# Patient Record
Sex: FEMALE | Race: ASIAN
[De-identification: names, ages, dates, MRNs, and addresses within clinical notes are randomized per-mention and may not be internally consistent; named-entity substitution may affect disease eponyms.]

---

## 2018-04-03 ENCOUNTER — HOSPITAL ENCOUNTER (EMERGENCY)
Dept: HOSPITAL 17 - NEPD | Age: 43
LOS: 1 days | Discharge: HOME | End: 2018-04-04
Payer: COMMERCIAL

## 2018-04-03 VITALS — RESPIRATION RATE: 16 BRPM | OXYGEN SATURATION: 98 %

## 2018-04-03 VITALS — BODY MASS INDEX: 34.08 KG/M2 | WEIGHT: 162.35 LBS | HEIGHT: 58 IN

## 2018-04-03 VITALS
RESPIRATION RATE: 18 BRPM | HEART RATE: 95 BPM | SYSTOLIC BLOOD PRESSURE: 146 MMHG | TEMPERATURE: 98.5 F | OXYGEN SATURATION: 98 % | DIASTOLIC BLOOD PRESSURE: 71 MMHG

## 2018-04-03 DIAGNOSIS — K76.0: ICD-10-CM

## 2018-04-03 DIAGNOSIS — R10.11: Primary | ICD-10-CM

## 2018-04-03 LAB
ALBUMIN SERPL-MCNC: 3.7 GM/DL (ref 3.4–5)
ALP SERPL-CCNC: 69 U/L (ref 45–117)
ALT SERPL-CCNC: 56 U/L (ref 10–53)
AST SERPL-CCNC: 23 U/L (ref 15–37)
BASOPHILS # BLD AUTO: 0 TH/MM3 (ref 0–0.2)
BASOPHILS NFR BLD: 0.5 % (ref 0–2)
BILIRUB SERPL-MCNC: 0.3 MG/DL (ref 0.2–1)
BUN SERPL-MCNC: 8 MG/DL (ref 7–18)
CALCIUM SERPL-MCNC: 8.6 MG/DL (ref 8.5–10.1)
CHLORIDE SERPL-SCNC: 103 MEQ/L (ref 98–107)
CREAT SERPL-MCNC: 0.73 MG/DL (ref 0.5–1)
EOSINOPHIL # BLD: 0.2 TH/MM3 (ref 0–0.4)
EOSINOPHIL NFR BLD: 2.5 % (ref 0–4)
ERYTHROCYTE [DISTWIDTH] IN BLOOD BY AUTOMATED COUNT: 13.4 % (ref 11.6–17.2)
GFR SERPLBLD BASED ON 1.73 SQ M-ARVRAT: 87 ML/MIN (ref 89–?)
GLUCOSE SERPL-MCNC: 195 MG/DL (ref 74–106)
HCO3 BLD-SCNC: 25.8 MEQ/L (ref 21–32)
HCT VFR BLD CALC: 40 % (ref 35–46)
HGB BLD-MCNC: 13.7 GM/DL (ref 11.6–15.3)
LYMPHOCYTES # BLD AUTO: 2.6 TH/MM3 (ref 1–4.8)
LYMPHOCYTES NFR BLD AUTO: 29.1 % (ref 9–44)
MCH RBC QN AUTO: 27.3 PG (ref 27–34)
MCHC RBC AUTO-ENTMCNC: 34.3 % (ref 32–36)
MCV RBC AUTO: 79.7 FL (ref 80–100)
MONOCYTE #: 0.8 TH/MM3 (ref 0–0.9)
MONOCYTES NFR BLD: 8.7 % (ref 0–8)
NEUTROPHILS # BLD AUTO: 5.4 TH/MM3 (ref 1.8–7.7)
NEUTROPHILS NFR BLD AUTO: 59.2 % (ref 16–70)
PLATELET # BLD: 323 TH/MM3 (ref 150–450)
PMV BLD AUTO: 6.9 FL (ref 7–11)
PROT SERPL-MCNC: 8.2 GM/DL (ref 6.4–8.2)
RBC # BLD AUTO: 5.02 MIL/MM3 (ref 4–5.3)
SODIUM SERPL-SCNC: 138 MEQ/L (ref 136–145)
WBC # BLD AUTO: 9 TH/MM3 (ref 4–11)

## 2018-04-03 PROCEDURE — 96361 HYDRATE IV INFUSION ADD-ON: CPT

## 2018-04-03 PROCEDURE — 96375 TX/PRO/DX INJ NEW DRUG ADDON: CPT

## 2018-04-03 PROCEDURE — 99284 EMERGENCY DEPT VISIT MOD MDM: CPT

## 2018-04-03 PROCEDURE — 84703 CHORIONIC GONADOTROPIN ASSAY: CPT

## 2018-04-03 PROCEDURE — 80053 COMPREHEN METABOLIC PANEL: CPT

## 2018-04-03 PROCEDURE — 85025 COMPLETE CBC W/AUTO DIFF WBC: CPT

## 2018-04-03 PROCEDURE — 96374 THER/PROPH/DIAG INJ IV PUSH: CPT

## 2018-04-03 PROCEDURE — 74176 CT ABD & PELVIS W/O CONTRAST: CPT

## 2018-04-03 PROCEDURE — 83690 ASSAY OF LIPASE: CPT

## 2018-04-03 PROCEDURE — 81001 URINALYSIS AUTO W/SCOPE: CPT

## 2018-04-03 NOTE — PD
HPI


Chief Complaint:  Abdominal Pain


Time Seen by Provider:  22:11


Travel History


International Travel<30 days:  No


Contact w/Intl Traveler<30days:  No


Traveled to known affect area:  No





History of Present Illness


HPI


The patient is a 42-year-old female who presents to the emergency department 

for abdominal pain.  The patient states she developed right upper quadrant 

abdominal pain to right yesterday.  The pain has persisted, is worse with eating

, nonradiating, and similar to her pain from previous gallstones.  The patient 

does have a history of cholecystectomy and hysterectomy.  The patient denies 

any nausea or vomiting, does note decreased oral intake.  She denies any 

diarrhea.  She denies any dysuria, frequency, urgency, or vaginal discharge.  

The patient does note subjective fevers with intermittent chills.  She denies 

any cough, chest pain, or shortness of breath.  Symptoms are moderate.  She has 

tried over-the-counter Tums without any alleviation of her symptoms.





PFSH


Past Medical History


Immunizations Current:  Yes


Thyroid Disease:  Yes


Pregnant?:  Not Pregnant





Past Surgical History


Cholecystectomy:  Yes


Hysterectomy:  Yes


Tonsillectomy:  Yes





Social History


Alcohol Use:  No


Tobacco Use:  No


Substance Use:  No





Allergies-Medications


(Allergen,Severity, Reaction):  


Coded Allergies:  


     No Known Allergies (Unverified , 1/8/15)


Reported Meds & Prescriptions





Reported Meds & Active Scripts


Active


Reported


Synthroid 75 mcg (Levothyroxine Sodium) 75 Mcg Tab 0.175 Mg PO DAILY 








Review of Systems


Except as stated in HPI:  all other systems reviewed are Neg


General / Constitutional:  Positive: Fever, Chills


Cardiovascular:  No: Chest Pain or Discomfort


Respiratory:  No: Cough, Shortness of Breath


Gastrointestinal:  Positive: Abdominal Pain, Loss of Appetite, No: Nausea, 

Vomiting, Diarrhea


Genitourinary:  No: Dysuria, Discharge, Vaginal Bleeding


Skin:  No Rash





Physical Exam


Narrative


GENERAL: Awake, alert, pleasant 42-year-old female who appears her stated age 

and is in no acute respiratory distress.


SKIN: Focused skin assessment warm/dry.


HEAD: Atraumatic. Normocephalic. 


EYES: Pupils equal and round. No scleral icterus. No injection or drainage. 


ENT: No nasal bleeding or discharge.  Slightly dry mucous membranes.


NECK: Trachea midline. No JVD. 


CARDIOVASCULAR: Regular rate and rhythm.  No murmur appreciated.


RESPIRATORY: No accessory muscle use. Clear to auscultation. Breath sounds 

equal bilaterally. 


GASTROINTESTINAL: Abdomen soft, tender palpation right upper quadrant and right 

flank.  Well-healed laparoscopic surgical scars.  No guarding or rigidity.


Back: No CVA tenderness.


MUSCULOSKELETAL: No obvious deformities. No clubbing.  No cyanosis.  No edema. 


NEUROLOGICAL: Awake and alert. No obvious cranial nerve deficits.  Motor 

grossly within normal limits. Normal speech.


PSYCHIATRIC: Appropriate mood and affect; insight and judgment normal.





Data


Data


Last Documented VS





Vital Signs








  Date Time  Temp Pulse Resp B/P (MAP) Pulse Ox O2 Delivery O2 Flow Rate FiO2


 


4/3/18 22:25   16  98 Room Air  


 


4/3/18 20:50 98.5 95  146/71 (96)    








Orders





 Orders


Complete Blood Count With Diff (4/3/18 20:53)


Comprehensive Metabolic Panel (4/3/18 20:53)


Lipase (4/3/18 20:53)


Urinalysis - C+S If Indicated (4/3/18 20:53)


Ed Urine Pregnancytest Poc (4/3/18 20:53)


Ct Abd/Pel W/O Iv Contrast (4/3/18 22:23)


Iv Access Insert/Monitor (4/3/18 22:23)


Ecg Monitoring (4/3/18 22:23)


Oximetry (4/3/18 22:23)


Morphine Inj (Morphine Inj) (4/3/18 22:30)


Ondansetron Inj (Zofran Inj) (4/3/18 22:30)


Sodium Chlor 0.9% 1000 Ml Inj (Ns 1000 M (4/3/18 22:23)


Sodium Chloride 0.9% Flush (Ns Flush) (4/3/18 22:30)


Ketorolac Inj (Toradol Inj) (4/3/18 22:30)


Potassium Chloride (Kcl) (4/3/18 23:45)


Morphine Inj (Morphine Inj) (4/4/18 00:45)


Ed Discharge Order (4/4/18 00:34)





Labs





Laboratory Tests








Test


  4/3/18


22:15 4/3/18


23:45


 


White Blood Count 9.0 TH/MM3  


 


Red Blood Count 5.02 MIL/MM3  


 


Hemoglobin 13.7 GM/DL  


 


Hematocrit 40.0 %  


 


Mean Corpuscular Volume 79.7 FL  


 


Mean Corpuscular Hemoglobin 27.3 PG  


 


Mean Corpuscular Hemoglobin


Concent 34.3 % 


  


 


 


Red Cell Distribution Width 13.4 %  


 


Platelet Count 323 TH/MM3  


 


Mean Platelet Volume 6.9 FL  


 


Neutrophils (%) (Auto) 59.2 %  


 


Lymphocytes (%) (Auto) 29.1 %  


 


Monocytes (%) (Auto) 8.7 %  


 


Eosinophils (%) (Auto) 2.5 %  


 


Basophils (%) (Auto) 0.5 %  


 


Neutrophils # (Auto) 5.4 TH/MM3  


 


Lymphocytes # (Auto) 2.6 TH/MM3  


 


Monocytes # (Auto) 0.8 TH/MM3  


 


Eosinophils # (Auto) 0.2 TH/MM3  


 


Basophils # (Auto) 0.0 TH/MM3  


 


CBC Comment DIFF FINAL  


 


Differential Comment   


 


Blood Urea Nitrogen 8 MG/DL  


 


Creatinine 0.73 MG/DL  


 


Random Glucose 195 MG/DL  


 


Total Protein 8.2 GM/DL  


 


Albumin 3.7 GM/DL  


 


Calcium Level 8.6 MG/DL  


 


Alkaline Phosphatase 69 U/L  


 


Aspartate Amino Transf


(AST/SGOT) 23 U/L 


  


 


 


Alanine Aminotransferase


(ALT/SGPT) 56 U/L 


  


 


 


Total Bilirubin 0.3 MG/DL  


 


Sodium Level 138 MEQ/L  


 


Potassium Level 3.2 MEQ/L  


 


Chloride Level 103 MEQ/L  


 


Carbon Dioxide Level 25.8 MEQ/L  


 


Anion Gap 9 MEQ/L  


 


Estimat Glomerular Filtration


Rate 87 ML/MIN 


  


 


 


Lipase 164 U/L  


 


Urine Color  YELLOW 


 


Urine Turbidity  HAZY 


 


Urine pH  6.5 


 


Urine Specific Gravity  1.016 


 


Urine Protein  TRACE mg/dL 


 


Urine Glucose (UA)  300 mg/dL 


 


Urine Ketones  NEG mg/dL 


 


Urine Occult Blood  NEG 


 


Urine Nitrite  NEG 


 


Urine Bilirubin  NEG 


 


Urine Urobilinogen


  


  LESS THAN 2.0


MG/DL


 


Urine Leukocyte Esterase  NEG 


 


Urine RBC  1 /hpf 


 


Urine WBC  3 /hpf 


 


Urine Squamous Epithelial


Cells 


  8 /hpf 


 


 


Urine Bacteria  RARE /hpf 


 


Urine Mucus  FEW /lpf 


 


Microscopic Urinalysis Comment


  


  CULT NOT


INDICATED











MDM


Medical Decision Making


Medical Screen Exam Complete:  Yes


Emergency Medical Condition:  Yes


Medical Record Reviewed:  Yes


Interpretation(s)





Last Impressions








Abdomen/Pelvis CT 4/3/18 2223 Signed





Impressions: 





 Service Date/Time:  Tuesday, April 3, 2018 23:08 - CONCLUSION: Hepatic 





 steatosis. No acute CT findings     Jude Rahman MD 








Differential Diagnosis


Differential diagnosis includes atypical appendicitis, retained biliary stone, 

pancreatitis, gastritis, peptic ulcer disease, lower lobe pneumonia, 

diverticulitis.


Narrative Course


IV was established, labs are drawn and sent, and the patient was placed on 

cardiac telemetry monitoring and continuous pulse oximetry monitoring.  The 

patient was a administered morphine, Toradol, Zofran, and IV fluids.  

Noncontrast CT of the abdomen and pelvis was performed.  CT of the abdomen and 

pelvis reveals hepatic steatosis, no acute CT findings.  Lipase was unremarkable

, no evidence of pancreatitis.  ALT was slightly elevated, however, the rest of 

the LFTs were unremarkable, I doubt retained biliary stone.  The patient may 

need further workup outpatient with a gastroenterologist for endoscopy to 

evaluate for possible gastritis and/or peptic ulcer disease.  The patient will 

be placed on Zantac twice a day.  The patient was reassessed at 12:30 AM, still 

had mild pain, therefore, was administered morphine 2 mg intravenously, she is 

taken a cab home.  I did advise her to follow-up with her primary physician if 

pain persists that she may need evaluation by gastroenterology.  Return for 

fever or intractable nausea/vomiting/abdominal pain.  Patient agrees and 

understands.





Diagnosis





 Primary Impression:  


 Abdominal pain


 Qualified Codes:  R10.11 - Right upper quadrant pain


Patient Instructions:  General Instructions





***Additional Instructions:  


Medications as directed.  Follow-up with gastroenterology if symptoms persist.  

Please provide the patient a copy of her CT results and lab results at 

discharge.  Clear liquid diet and advance as tolerated.


***Med/Other Pt SpecificInfo:  Prescription(s) given


Scripts


Hydrocodone-Acetaminophen (Norco) 5 Mg-325 Mg Tab


1 TAB PO Q6H Y for PAIN, #10 TAB 0 Refills


   Prov: Frandy Bloom MD         4/4/18 


Ondansetron Odt (Zofran Odt) 4 Mg Tab


4 MG SL Q6HR Y for Nausea/Vomiting, #7 TAB 0 Refills


   Prov: Frandy Bloom MD         4/4/18 


Ranitidine (Zantac 150 Maximum Strength) 150 Mg Tab


150 MG PO BID for 14 Days, #28 TAB


   Prov: Frandy Bloom MD         4/4/18


Disposition:  01 DISCHARGE HOME


Condition:  Stable











Frandy Bloom MD Apr 3, 2018 22:31

## 2018-04-03 NOTE — RADRPT
EXAM DATE/TIME:  04/03/2018 23:08 

 

[facility] COMPARISON:     

No previous studies available for comparison.

 

 

INDICATIONS :     

Right side abdominal pain. 

                  

 

ORAL CONTRAST:      

No oral contrast ingested.

                  

 

RADIATION DOSE:     

6.57 CTDIvol (mGy) 

 

 

MEDICAL HISTORY :     

None  

 

SURGICAL HISTORY :      

Cholecystectomy. Hysterectomy.

 

ENCOUNTER:      

Initial

 

ACUITY:      

3 days

 

PAIN SCALE:      

10/10

 

LOCATION:       

Right  abdomen. 

 

TECHNIQUE:     

Volumetric scanning of the abdomen and pelvis was performed.  Using automated exposure control and ad
justment of the mA and/or kV according to patient size, radiation dose was kept as low as reasonably 
achievable to obtain optimal diagnostic quality images.  DICOM format image data is available electro
nically for review and comparison.  

 

FINDINGS:     

 

LOWER LUNGS:     

Mild atelectasis in the posterior lateral left lung base

 

LIVER:     

Diffusely diminished hepatic attenuation suggesting steatosis. No focal mass. No biliary ductal dilat
ation. Gallbladder surgically absent

 

SPLEEN:     

Normal size without lesion.

 

PANCREAS:     

Within normal limits. 

 

KIDNEYS:     

Normal in size and shape.  There is no mass, stone, or hydronephrosis.

 

ADRENAL GLANDS:     

Within normal limits.

 

VASCULAR:     

There is no aortic aneurysm.

 

BOWEL/MESENTERY:     

The stomach, small bowel, and colon demonstrate no acute abnormality.  There is no free intraperitone
al air or fluid. 

 

ABDOMINAL WALL:     

Within normal limits.

 

RETROPERITONEUM:     

There is no lymphadenopathy.

 

BLADDER:     

No wall thickening or mass.

 

REPRODUCTIVE:     

Within normal limits.

 

INGUINAL:     

There is no lymphadenopathy or hernia.

 

MUSCULOSKELETAL:     

Within normal limits for patient age.

 

CONCLUSION:     Hepatic steatosis.

No acute CT findings 

 

 

 Jude Rahman MD on April 03, 2018 at 23:16           

Board Certified Radiologist.

 This report was verified electronically.

## 2018-04-04 LAB
BACTERIA #/AREA URNS HPF: (no result) /HPF
COLOR UR: YELLOW
GLUCOSE UR STRIP-MCNC: 300 MG/DL
HGB UR QL STRIP: (no result)
KETONES UR STRIP-MCNC: (no result) MG/DL
MUCOUS THREADS #/AREA URNS LPF: (no result) /LPF
NITRITE UR QL STRIP: (no result)
SP GR UR STRIP: 1.02 (ref 1–1.03)
SQUAMOUS #/AREA URNS HPF: 8 /HPF (ref 0–5)
URINE LEUKOCYTE ESTERASE: (no result)